# Patient Record
Sex: MALE | Race: BLACK OR AFRICAN AMERICAN | Employment: PART TIME | URBAN - METROPOLITAN AREA
[De-identification: names, ages, dates, MRNs, and addresses within clinical notes are randomized per-mention and may not be internally consistent; named-entity substitution may affect disease eponyms.]

---

## 2023-12-07 ENCOUNTER — HOSPITAL ENCOUNTER (EMERGENCY)
Age: 18
Discharge: HOME | End: 2023-12-07

## 2023-12-07 VITALS
DIASTOLIC BLOOD PRESSURE: 88 MMHG | TEMPERATURE: 98 F | HEIGHT: 70 IN | HEART RATE: 55 BPM | BODY MASS INDEX: 22.41 KG/M2 | OXYGEN SATURATION: 98 % | SYSTOLIC BLOOD PRESSURE: 128 MMHG | RESPIRATION RATE: 16 BRPM | WEIGHT: 156.53 LBS

## 2023-12-07 DIAGNOSIS — J33.9 NASAL POLYP, UNSPECIFIED: Primary | ICD-10-CM

## 2023-12-07 PROCEDURE — 99282 EMERGENCY DEPT VISIT SF MDM: CPT

## 2023-12-08 NOTE — ED PROVIDER NOTES
**ADVANCED PRACTICE PROVIDER, I HAVE EVALUATED THIS PATIENT**        [address] ENCOUNTER      Pt Name: Michael Winter  HEV:[id number]  [address] [facility] 2005  Date of evaluation: 12/7/2023  Provider: Art Thompson PA-C  Note Started: 11:27 PM EST 12/7/23        Chief Complaint:    Chief Complaint   Patient presents with    Swelling     Patient ambulatory to room 38 with complaint of swelling to left nare. Patient states he doesn't know exactly when it started but seems more than a week ago. Denies pain. Nursing Notes, Past Medical Hx, Past Surgical Hx, Social Hx, Allergies, and Family Hx were all reviewed and agreed with or any disagreements were addressed in the HPI.    HPI: (Location, Duration, Timing, Severity, Quality, Assoc Sx, Context, Modifying factors)    History From: Patient          Chief Complaint of lump in left nostril    This is a  19 y.o. male who presents indicating that maybe for a week or maybe even longer he has noticed that he has had a lump in the left nostril. He states that he just knows that it is abnormal but it is not really giving him a lot of trouble. He feels like he is breathing okay both day and night. He is not getting a lot of bleeding from it. However it bothers him that he knows something is wrong in the left nostril. Therefore he decided to come to the ER this evening. No pain associated. No recent trauma. PastMedical/Surgical History:  History reviewed. No pertinent past medical history. History reviewed. No pertinent surgical history. Medications:  Previous Medications    No medications on file       Review of Systems:  (1 systems needed)  Review of Systems  Positive history as above, no runny or stuffy nose compared to usual cough or congestion sore throat earache or headache fevers or chills belly pain or extremity acute changes.   \"Positives and Pertinent negatives as per HPI\"    Physical

## 2023-12-08 NOTE — DISCHARGE INSTRUCTIONS
Home in good condition to use salt water spray to keep mucous membranes moist and prevent bleeding, may use Breathe Right strips at night, and call ENT to arrange outpatient follow-up and further care and treatment. Return to the ER for any emergency worsening or concern.

## 2023-12-08 NOTE — ED NOTES
I have reviewed discharge instructions with the patient. The patient verbalized understanding.       Nikko Andrews, [address] [place] [address]  12/07/23 1371

## 2023-12-08 NOTE — ED TRIAGE NOTES
Patient ambulatory to room 38 with complaint of swelling to left nare. Patient states he doesn't know exactly when it started but seems more than a week ago. Denies pain.